# Patient Record
Sex: MALE | Race: OTHER | NOT HISPANIC OR LATINO | URBAN - METROPOLITAN AREA
[De-identification: names, ages, dates, MRNs, and addresses within clinical notes are randomized per-mention and may not be internally consistent; named-entity substitution may affect disease eponyms.]

---
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
## 2021-10-15 ENCOUNTER — OUTPATIENT (OUTPATIENT)
Dept: OUTPATIENT SERVICES | Facility: HOSPITAL | Age: 21
LOS: 1 days | End: 2021-10-15

## 2021-10-15 VITALS
TEMPERATURE: 98 F | HEART RATE: 76 BPM | HEIGHT: 69 IN | OXYGEN SATURATION: 98 % | RESPIRATION RATE: 16 BRPM | DIASTOLIC BLOOD PRESSURE: 70 MMHG | SYSTOLIC BLOOD PRESSURE: 120 MMHG | WEIGHT: 201.94 LBS

## 2021-10-15 DIAGNOSIS — L05.91 PILONIDAL CYST WITHOUT ABSCESS: ICD-10-CM

## 2021-10-15 DIAGNOSIS — Z01.818 ENCOUNTER FOR OTHER PREPROCEDURAL EXAMINATION: ICD-10-CM

## 2021-10-15 LAB
HCT VFR BLD CALC: 41.5 % — SIGNIFICANT CHANGE UP (ref 39–50)
HGB BLD-MCNC: 14.2 G/DL — SIGNIFICANT CHANGE UP (ref 13–17)
MCHC RBC-ENTMCNC: 29.6 PG — SIGNIFICANT CHANGE UP (ref 27–34)
MCHC RBC-ENTMCNC: 34.2 GM/DL — SIGNIFICANT CHANGE UP (ref 32–36)
MCV RBC AUTO: 86.6 FL — SIGNIFICANT CHANGE UP (ref 80–100)
NRBC # BLD: 0 /100 WBCS — SIGNIFICANT CHANGE UP
NRBC # FLD: 0 K/UL — SIGNIFICANT CHANGE UP
PLATELET # BLD AUTO: 249 K/UL — SIGNIFICANT CHANGE UP (ref 150–400)
RBC # BLD: 4.79 M/UL — SIGNIFICANT CHANGE UP (ref 4.2–5.8)
RBC # FLD: 12.6 % — SIGNIFICANT CHANGE UP (ref 10.3–14.5)
WBC # BLD: 8.09 K/UL — SIGNIFICANT CHANGE UP (ref 3.8–10.5)
WBC # FLD AUTO: 8.09 K/UL — SIGNIFICANT CHANGE UP (ref 3.8–10.5)

## 2021-10-15 RX ORDER — SODIUM CHLORIDE 9 MG/ML
3 INJECTION INTRAMUSCULAR; INTRAVENOUS; SUBCUTANEOUS ONCE
Refills: 0 | Status: DISCONTINUED | OUTPATIENT
Start: 2021-10-21 | End: 2021-11-04

## 2021-10-15 RX ORDER — SODIUM CHLORIDE 9 MG/ML
1000 INJECTION, SOLUTION INTRAVENOUS
Refills: 0 | Status: DISCONTINUED | OUTPATIENT
Start: 2021-10-21 | End: 2021-11-04

## 2021-10-15 NOTE — H&P PST ADULT - HISTORY OF PRESENT ILLNESS
21 year old male with pilonidal cyst since 2020, with c/o pain and  pus and bloody drainage. Pt states he was put on antibiotics which he started 3 days ago. Pt present today for presurgical evaluation for .... 21 year old male with pilonidal cyst since 2020, with c/o pain and  pus and bloody drainage. Pt states he was put on antibiotics which he started 3 days ago. Pt present today for presurgical evaluation for Excision Pilonidal Cyst.

## 2021-10-15 NOTE — H&P PST ADULT - PROBLEM SELECTOR PLAN 1
Pt scheduled for surgery on 10/21/21.  Pre-op instructions provided. Pt verbalized understanding.   Pepcid provided for GI prophylaxis.   Pt instructed to f/u with surgeon regarding preop COVID testing.

## 2021-10-15 NOTE — H&P PST ADULT - NSICDXFAMILYHX_GEN_ALL_CORE_FT
No FAMILY HISTORY:  Sibling  Still living? Unknown  FH: type 1 diabetes, Age at diagnosis: Age Unknown

## 2021-10-18 ENCOUNTER — APPOINTMENT (OUTPATIENT)
Dept: DISASTER EMERGENCY | Facility: CLINIC | Age: 21
End: 2021-10-18

## 2021-10-19 LAB — SARS-COV-2 N GENE NPH QL NAA+PROBE: NOT DETECTED

## 2021-10-20 ENCOUNTER — TRANSCRIPTION ENCOUNTER (OUTPATIENT)
Age: 21
End: 2021-10-20

## 2021-10-21 ENCOUNTER — OUTPATIENT (OUTPATIENT)
Dept: OUTPATIENT SERVICES | Facility: HOSPITAL | Age: 21
LOS: 1 days | Discharge: HOME | End: 2021-10-21
Payer: MEDICAID

## 2021-10-21 ENCOUNTER — RESULT REVIEW (OUTPATIENT)
Age: 21
End: 2021-10-21

## 2021-10-21 VITALS
SYSTOLIC BLOOD PRESSURE: 114 MMHG | OXYGEN SATURATION: 99 % | HEART RATE: 72 BPM | TEMPERATURE: 98 F | RESPIRATION RATE: 18 BRPM | DIASTOLIC BLOOD PRESSURE: 69 MMHG

## 2021-10-21 VITALS
OXYGEN SATURATION: 98 % | TEMPERATURE: 98 F | HEART RATE: 92 BPM | DIASTOLIC BLOOD PRESSURE: 71 MMHG | WEIGHT: 201.94 LBS | HEIGHT: 69 IN | RESPIRATION RATE: 22 BRPM | SYSTOLIC BLOOD PRESSURE: 122 MMHG

## 2021-10-21 DIAGNOSIS — L05.91 PILONIDAL CYST WITHOUT ABSCESS: ICD-10-CM

## 2021-10-21 PROCEDURE — 88304 TISSUE EXAM BY PATHOLOGIST: CPT | Mod: 26

## 2021-10-21 RX ORDER — CEPHALEXIN 500 MG
1 CAPSULE ORAL
Qty: 0 | Refills: 0 | DISCHARGE

## 2021-10-21 RX ORDER — SODIUM CHLORIDE 9 MG/ML
1000 INJECTION, SOLUTION INTRAVENOUS
Refills: 0 | Status: DISCONTINUED | OUTPATIENT
Start: 2021-10-21 | End: 2021-11-04

## 2021-10-21 NOTE — ASU DISCHARGE PLAN (ADULT/PEDIATRIC) - CARE PROVIDER_API CALL
[facility]  [address] [place] [address]  Phone: [phone]  Fax: [phone]  Follow Up Time: 2 weeks

## 2021-10-21 NOTE — ASU DISCHARGE PLAN (ADULT/PEDIATRIC) - CALL YOUR DOCTOR IF YOU HAVE ANY OF THE FOLLOWING:
Bleeding that does not stop/Swelling that gets worse/Pain not relieved by Medications/Wound/Surgical Site with redness, or foul smelling discharge or pus/Numbness, tingling, color or temperature change to extremity/Unable to urinate Bleeding that does not stop/Swelling that gets worse/Pain not relieved by Medications/Fever greater than (need to indicate Fahrenheit or Celsius)/Wound/Surgical Site with redness, or foul smelling discharge or pus/Numbness, tingling, color or temperature change to extremity/Unable to urinate

## 2021-10-26 LAB — SURGICAL PATHOLOGY STUDY: SIGNIFICANT CHANGE UP

## 2024-11-27 NOTE — H&P PST ADULT - MUSCULOSKELETAL
[FreeTextEntry1] : 54M with fall found to have small cortical SAH on left frontal lobe, now resolved also with concern for right vert dissection. 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 Maintains care with Dr. Broussard. No follow up necessary.   Dr. D'Amico independently reviewed all available images with patient.   PLAN: - Continue f/u with PCP for comprehensive care - RTC prn   Patient verbalizes understanding of today's discussion and next steps in treatment plan.   Today, my ACP, Gabriela Zimmerman, was here to observe my evaluation and management services for this patient to be followed going forward.     A total of 15 minutes was spent reviewing the labs, imaging and physical examination of the patient. We discussed the diagnosis, and the plan. The patient's questions were answered. The patient demonstrated an excellent understanding of the plan.    details… detailed exam ROM intact/no joint swelling/no joint erythema/no joint warmth/no calf tenderness/normal strength

## 2025-02-24 ENCOUNTER — APPOINTMENT (OUTPATIENT)
Dept: SURGERY | Facility: CLINIC | Age: 25
End: 2025-02-24
Payer: COMMERCIAL

## 2025-02-24 VITALS
OXYGEN SATURATION: 98 % | HEIGHT: 70.87 IN | WEIGHT: 212 LBS | SYSTOLIC BLOOD PRESSURE: 107 MMHG | BODY MASS INDEX: 29.68 KG/M2 | HEART RATE: 78 BPM | DIASTOLIC BLOOD PRESSURE: 61 MMHG

## 2025-02-24 DIAGNOSIS — K40.90 UNILATERAL INGUINAL HERNIA, W/OUT OBSTRUCTION OR GANGRENE, NOT SPECIFIED AS RECURRENT: ICD-10-CM

## 2025-02-24 PROCEDURE — 99203 OFFICE O/P NEW LOW 30 MIN: CPT

## 2025-02-25 ENCOUNTER — NON-APPOINTMENT (OUTPATIENT)
Age: 25
End: 2025-02-25

## 2025-03-07 ENCOUNTER — APPOINTMENT (OUTPATIENT)
Dept: CT IMAGING | Facility: CLINIC | Age: 25
End: 2025-03-07

## 2025-06-29 ENCOUNTER — NON-APPOINTMENT (OUTPATIENT)
Age: 25
End: 2025-06-29

## 2025-07-13 ENCOUNTER — NON-APPOINTMENT (OUTPATIENT)
Age: 25
End: 2025-07-13